# Patient Record
Sex: MALE | Race: OTHER | NOT HISPANIC OR LATINO
[De-identification: names, ages, dates, MRNs, and addresses within clinical notes are randomized per-mention and may not be internally consistent; named-entity substitution may affect disease eponyms.]

---

## 2022-01-11 PROBLEM — Z00.00 ENCOUNTER FOR PREVENTIVE HEALTH EXAMINATION: Status: ACTIVE | Noted: 2022-01-11

## 2022-01-14 ENCOUNTER — APPOINTMENT (OUTPATIENT)
Dept: OTOLARYNGOLOGY | Facility: CLINIC | Age: 32
End: 2022-01-14
Payer: COMMERCIAL

## 2022-01-14 VITALS
WEIGHT: 143.3 LBS | TEMPERATURE: 97 F | DIASTOLIC BLOOD PRESSURE: 61 MMHG | HEIGHT: 70 IN | HEART RATE: 61 BPM | SYSTOLIC BLOOD PRESSURE: 101 MMHG | BODY MASS INDEX: 20.52 KG/M2

## 2022-01-14 DIAGNOSIS — R06.83 SNORING: ICD-10-CM

## 2022-01-14 DIAGNOSIS — J34.2 DEVIATED NASAL SEPTUM: ICD-10-CM

## 2022-01-14 DIAGNOSIS — D75.A GLUCOSE-6-PHOSPHATE DEHYDROGENASE: ICD-10-CM

## 2022-01-14 DIAGNOSIS — Z78.9 OTHER SPECIFIED HEALTH STATUS: ICD-10-CM

## 2022-01-14 PROCEDURE — 99204 OFFICE O/P NEW MOD 45 MIN: CPT | Mod: 25

## 2022-01-14 PROCEDURE — 31231 NASAL ENDOSCOPY DX: CPT

## 2022-02-21 PROBLEM — R06.83 SNORING: Status: ACTIVE | Noted: 2022-02-21

## 2022-02-21 NOTE — HISTORY OF PRESENT ILLNESS
[de-identified] : Mr. Llamas is a 31 year old man who was referred by Dr. Marks for deviated septum and turbinate hypertrophy\par PMH:  G6PD deficiency\par \par Trouble breathing through his nose for his whole life, worse when he has a URI such as now. He breathes through his mouth.  No change in nasal breathing when lying down.\par Has seasonal allergies, mainly in spring.\par Years ago,he tried steroid nasal sprays consistently for 4-5 weeks at a time without improvement.. \par He uses Zyrtec as needed with some improvement.\par Snores at night.  His partner has woken him up at night for "weird snoring" on occasion. He feels rested on waking up.\par No runny nose, postnasal drip, chronic sinusitis. \par No nasal trauma. \par

## 2022-02-21 NOTE — REVIEW OF SYSTEMS
[Patient Intake Form Reviewed] : Patient intake form was reviewed [As Noted in HPI] : as noted in HPI [Negative] : Heme/Lymph [FreeTextEntry6] : chest congestion

## 2022-02-21 NOTE — CONSULT LETTER
[Dear  ___] : Dear  [unfilled], [( Thank you for referring [unfilled] for consultation for _____ )] : Thank you for referring [unfilled] for consultation for [unfilled] [Please see my note below.] : Please see my note below. [Consult Closing:] : Thank you very much for allowing me to participate in the care of this patient.  If you have any questions, please do not hesitate to contact me. [Sincerely,] : Sincerely, [FreeTextEntry2] : Ulises Marks MD\par [address] [facility]\[place] [address]\par  [FreeTextEntry3] : \par Brittny Thompson MD \par Otolaryngology, Head and Neck Surgery \par \par

## 2022-02-21 NOTE — ASSESSMENT
[FreeTextEntry1] : Mr. Llamas has chronic nasal congestion for many years due to deviated nasal septum and turbinate hypertrophy.\par He also has seasonal allergies and G6PD deficiency.\par No change in sx after at least 1 month of steroid nasal spray.\par -->  I recommended septoplasty and bilateral  inferior turbinate reduction.\par Risks, benefits and alternatives were discussed with patient.\par Risks include, but are not limited to, bleeding, infection, septal hematoma or abscess, septal perforation, intranasal scarring, difficulty breathing, and need for further treatment.\par He  is aware that surgery does not treat allergy.\par He will consider surgery.  He will contact my office if he has additional questions or if he want to schedule surgery.\par \par Snoring without observed apneas or daytime somnolence.\par \par \par

## 2022-02-21 NOTE — PROCEDURE
[FreeTextEntry6] : \par Indication: deviated nasal septum (can't see past on anterior rhinoscopy)\par -Verbal consent was obtained from patient prior to exam. \par - Cecilio-Synephrine and lidocaine 2% spray applied to nose bilaterally.\par Nasal endoscopy was performed with flexible scope.\par Findings: \par -- Inferior turbinate hypertrophy  bilateral.  Inferior meatus clear bilateral.\par -- Septum was deviated to the left touching inferior turbinate all the way, with a right spur touching the lateral wall\par -- No polyps either side nose\par -- Mucus clear bilateral\par -- Middle and superior turbinates normal bilateral\par -- Middle meatus clear bilateral.  SER clear bilateral.\par -- Nasopharynx without mass or exudate\par -- Adenoids were absent   \par -- Eustachian orifices were clear bilateral\par \par The patient tolerated the procedure well.\par

## 2022-02-21 NOTE — PHYSICAL EXAM
[Midline] : trachea located in midline position [FreeTextEntry1] : No hoarseness.  [Nasal Endoscopy Performed] : nasal endoscopy was performed, see procedure section for findings [] : septum deviated bilaterally [de-identified] : septum deviation left anteriorly with septal spur on the right [Normal] : palatine tonsils are normal [de-identified] : 3+ [de-identified] : Carotid pulses 2+ bilateral.

## 2022-04-25 ENCOUNTER — NON-APPOINTMENT (OUTPATIENT)
Age: 32
End: 2022-04-25

## 2022-04-25 ENCOUNTER — APPOINTMENT (OUTPATIENT)
Dept: INTERNAL MEDICINE | Facility: CLINIC | Age: 32
End: 2022-04-25
Payer: COMMERCIAL

## 2022-04-25 VITALS
BODY MASS INDEX: 21.47 KG/M2 | WEIGHT: 150 LBS | OXYGEN SATURATION: 98 % | HEART RATE: 63 BPM | TEMPERATURE: 98.6 F | SYSTOLIC BLOOD PRESSURE: 110 MMHG | HEIGHT: 70 IN | DIASTOLIC BLOOD PRESSURE: 73 MMHG

## 2022-04-25 DIAGNOSIS — Z01.812 ENCOUNTER FOR PREPROCEDURAL LABORATORY EXAMINATION: ICD-10-CM

## 2022-04-25 DIAGNOSIS — Z01.818 ENCOUNTER FOR OTHER PREPROCEDURAL EXAMINATION: ICD-10-CM

## 2022-04-25 PROCEDURE — 36415 COLL VENOUS BLD VENIPUNCTURE: CPT

## 2022-04-25 PROCEDURE — 99204 OFFICE O/P NEW MOD 45 MIN: CPT | Mod: 25

## 2022-04-25 NOTE — PHYSICAL EXAM
[No Acute Distress] : no acute distress [Well Nourished] : well nourished [Well Developed] : well developed [Well-Appearing] : well-appearing [Normal Sclera/Conjunctiva] : normal sclera/conjunctiva [EOMI] : extraocular movements intact [Thyroid Normal, No Nodules] : the thyroid was normal and there were no nodules present [No Respiratory Distress] : no respiratory distress  [Clear to Auscultation] : lungs were clear to auscultation bilaterally [Normal Rate] : normal rate  [Regular Rhythm] : with a regular rhythm [Normal S1, S2] : normal S1 and S2 [No Murmur] : no murmur heard [No Edema] : there was no peripheral edema [No Extremity Clubbing/Cyanosis] : no extremity clubbing/cyanosis [Soft] : abdomen soft [Non Tender] : non-tender [Non-distended] : non-distended [No Masses] : no abdominal mass palpated [No HSM] : no HSM [No Rash] : no rash [Coordination Grossly Intact] : coordination grossly intact [No Focal Deficits] : no focal deficits [Normal Gait] : normal gait [Normal Affect] : the affect was normal [Alert and Oriented x3] : oriented to person, place, and time [Normal Insight/Judgement] : insight and judgment were intact

## 2022-04-26 DIAGNOSIS — D72.19 OTHER EOSINOPHILIA: ICD-10-CM

## 2022-04-26 LAB
ALBUMIN SERPL ELPH-MCNC: 4.4 G/DL
ALP BLD-CCNC: 70 U/L
ALT SERPL-CCNC: 27 U/L
ANION GAP SERPL CALC-SCNC: 11 MMOL/L
APPEARANCE: CLEAR
APTT BLD: 30.4 SEC
AST SERPL-CCNC: 19 U/L
BASOPHILS # BLD AUTO: 0.03 K/UL
BASOPHILS NFR BLD AUTO: 0.5 %
BILIRUB SERPL-MCNC: 0.8 MG/DL
BILIRUBIN URINE: NEGATIVE
BLOOD URINE: NEGATIVE
BUN SERPL-MCNC: 15 MG/DL
CALCIUM SERPL-MCNC: 9.5 MG/DL
CHLORIDE SERPL-SCNC: 108 MMOL/L
CO2 SERPL-SCNC: 22 MMOL/L
COLOR: NORMAL
CREAT SERPL-MCNC: 0.88 MG/DL
EGFR: 119 ML/MIN/1.73M2
EOSINOPHIL # BLD AUTO: 0.66 K/UL
EOSINOPHIL NFR BLD AUTO: 10.1 %
GLUCOSE QUALITATIVE U: NEGATIVE
GLUCOSE SERPL-MCNC: 94 MG/DL
HCT VFR BLD CALC: 39.2 %
HGB BLD-MCNC: 12.1 G/DL
IMM GRANULOCYTES NFR BLD AUTO: 0.3 %
INR PPP: 0.96 RATIO
KETONES URINE: NEGATIVE
LEUKOCYTE ESTERASE URINE: NEGATIVE
LYMPHOCYTES # BLD AUTO: 2.04 K/UL
LYMPHOCYTES NFR BLD AUTO: 31.2 %
MAN DIFF?: NORMAL
MCHC RBC-ENTMCNC: 28.3 PG
MCHC RBC-ENTMCNC: 30.9 GM/DL
MCV RBC AUTO: 91.8 FL
MONOCYTES # BLD AUTO: 0.39 K/UL
MONOCYTES NFR BLD AUTO: 6 %
NEUTROPHILS # BLD AUTO: 3.39 K/UL
NEUTROPHILS NFR BLD AUTO: 51.9 %
NITRITE URINE: NEGATIVE
PH URINE: 6
PLATELET # BLD AUTO: 265 K/UL
POTASSIUM SERPL-SCNC: 4.3 MMOL/L
PROT SERPL-MCNC: 7.3 G/DL
PROTEIN URINE: NEGATIVE
PT BLD: 11.2 SEC
RBC # BLD: 4.27 M/UL
RBC # FLD: 12.4 %
SODIUM SERPL-SCNC: 141 MMOL/L
SPECIFIC GRAVITY URINE: 1.02
UROBILINOGEN URINE: NORMAL
WBC # FLD AUTO: 6.53 K/UL

## 2022-04-26 NOTE — HISTORY OF PRESENT ILLNESS
[No Pertinent Cardiac History] : no history of aortic stenosis, atrial fibrillation, coronary artery disease, recent myocardial infarction, or implantable device/pacemaker [No Pertinent Pulmonary History] : no history of asthma, COPD, sleep apnea, or smoking [No Adverse Anesthesia Reaction] : no adverse anesthesia reaction in self or family member [(Patient denies any chest pain, claudication, dyspnea on exertion, orthopnea, palpitations or syncope)] : Patient denies any chest pain, claudication, dyspnea on exertion, orthopnea, palpitations or syncope [Excellent (>10 METs)] : Excellent (>10 METs) [Chronic Anticoagulation] : no chronic anticoagulation [Chronic Kidney Disease] : no chronic kidney disease [Diabetes] : no diabetes [FreeTextEntry1] : Septoplasty and turbinate reduction [FreeTextEntry2] : 5/16/2022 [FreeTextEntry3] : Dr. Thompson [FreeTextEntry4] : h/o chronic nasal congestion unresponsive to conservative management. Found to have swollen turbinates and deviated nasal septum.

## 2022-04-26 NOTE — ASSESSMENT
[High Risk Surgery - Intraperitoneal, Intrathoracic or Supringuinal Vascular Procedures] : High Risk Surgery - Intraperitoneal, Intrathoracic or Supringuinal Vascular Procedures - No (0) [Ischemic Heart Disease] : Ischemic Heart Disease - No (0) [Congestive Heart Failure] : Congestive Heart Failure - No (0) [Prior Cerebrovascular Accident or TIA] : Prior Cerebrovascular Accident or TIA - No (0) [Creatinine >= 2mg/dL (1 Point)] : Creatinine >= 2mg/dL - No (0) [Insulin-dependent Diabetic (1 Point)] : Insulin-dependent Diabetic - No (0) [0] : 0 , RCRI Class: I, Risk of Post-Op Cardiac Complications: 3.9%, 95% CI for Risk Estimate: 2.8% - 5.4% [Patient Optimized for Surgery] : Patient optimized for surgery [No Further Testing Recommended] : no further testing recommended [FreeTextEntry4] : LOW risk for LOW risk procedure. May proceed with elective surgery.

## 2022-05-05 ENCOUNTER — TRANSCRIPTION ENCOUNTER (OUTPATIENT)
Age: 32
End: 2022-05-05

## 2022-05-13 ENCOUNTER — LABORATORY RESULT (OUTPATIENT)
Age: 32
End: 2022-05-13

## 2022-05-13 NOTE — ASU PATIENT PROFILE, ADULT - NS PREOP UNDERSTANDS INFO
yes No solid food after midnight, take off all body rings, no alcoholic beverages, drugs, tobaccos 24 hours prior to the day of surgery, loose comfortable clothes, shoes, dress in layers, patient to bring picture ID, covid ID card, insurance card and a credit card for any financial transactions. Address and parking information given. Patient understands and verbalizes preop instructions, time given/no

## 2022-05-13 NOTE — ASU PATIENT PROFILE, ADULT - MEDICATION HERBAL REMEDIES, PROFILE
CalledâPSA normal.  TSH elevated indication patient is hypothyroid. Recommend dose change thyroxine 188 mcg. #30 to local pharmacy. Check repeat TSH in 1 month. Cholesterol 178,  with goal of less than 100. Continue with low-cholesterol diet. Chemistry within normal limits. Elevated blood sugar 119.   CBC normal
Patient notified via spouse Charisma Kaplan. Orders placed.
Please further advise. Do you want 2  prescriptions? 100mcg + 88mcg tab daily ?
Yes , thanks
no

## 2022-05-13 NOTE — ASU PATIENT PROFILE, ADULT - FALL HARM RISK - UNIVERSAL INTERVENTIONS
Bed in lowest position, wheels locked, appropriate side rails in place/Call bell, personal items and telephone in reach/Instruct patient to call for assistance before getting out of bed or chair/Non-slip footwear when patient is out of bed/[place] to call system/Physically safe environment - no spills, clutter or unnecessary equipment/Purposeful Proactive Rounding/Room/bathroom lighting operational, light cord in reach

## 2022-05-16 ENCOUNTER — NON-APPOINTMENT (OUTPATIENT)
Age: 32
End: 2022-05-16

## 2022-05-16 ENCOUNTER — OUTPATIENT (OUTPATIENT)
Dept: OUTPATIENT SERVICES | Facility: HOSPITAL | Age: 32
LOS: 1 days | Discharge: HOME | End: 2022-05-16
Payer: COMMERCIAL

## 2022-05-16 ENCOUNTER — TRANSCRIPTION ENCOUNTER (OUTPATIENT)
Age: 32
End: 2022-05-16

## 2022-05-16 ENCOUNTER — RESULT REVIEW (OUTPATIENT)
Age: 32
End: 2022-05-16

## 2022-05-16 ENCOUNTER — APPOINTMENT (OUTPATIENT)
Dept: OTOLARYNGOLOGY | Facility: HOSPITAL | Age: 32
End: 2022-05-16

## 2022-05-16 VITALS
DIASTOLIC BLOOD PRESSURE: 57 MMHG | RESPIRATION RATE: 16 BRPM | OXYGEN SATURATION: 100 % | TEMPERATURE: 98 F | HEIGHT: 71 IN | HEART RATE: 100 BPM | WEIGHT: 147.93 LBS | SYSTOLIC BLOOD PRESSURE: 104 MMHG

## 2022-05-16 VITALS
SYSTOLIC BLOOD PRESSURE: 99 MMHG | HEART RATE: 63 BPM | RESPIRATION RATE: 16 BRPM | OXYGEN SATURATION: 98 % | DIASTOLIC BLOOD PRESSURE: 64 MMHG | TEMPERATURE: 97 F

## 2022-05-16 PROCEDURE — 88300 SURGICAL PATH GROSS: CPT | Mod: 26

## 2022-05-16 PROCEDURE — 30802 ABLATE INF TURBINATE SUBMUC: CPT | Mod: GC

## 2022-05-16 PROCEDURE — 30520 REPAIR OF NASAL SEPTUM: CPT | Mod: GC

## 2022-05-16 RX ORDER — CEPHALEXIN 500 MG
1 CAPSULE ORAL
Qty: 0 | Refills: 0 | DISCHARGE

## 2022-05-16 RX ORDER — SODIUM CHLORIDE 9 MG/ML
1000 INJECTION, SOLUTION INTRAVENOUS ONCE
Refills: 0 | Status: COMPLETED | OUTPATIENT
Start: 2022-05-16 | End: 2022-05-16

## 2022-05-16 RX ORDER — ACETAMINOPHEN 500 MG
1000 TABLET ORAL ONCE
Refills: 0 | Status: COMPLETED | OUTPATIENT
Start: 2022-05-16 | End: 2022-05-16

## 2022-05-16 RX ORDER — SODIUM CHLORIDE 9 MG/ML
500 INJECTION, SOLUTION INTRAVENOUS
Refills: 0 | Status: DISCONTINUED | OUTPATIENT
Start: 2022-05-16 | End: 2022-05-16

## 2022-05-16 RX ORDER — FENTANYL CITRATE 50 UG/ML
50 INJECTION INTRAVENOUS
Refills: 0 | Status: DISCONTINUED | OUTPATIENT
Start: 2022-05-16 | End: 2022-05-16

## 2022-05-16 RX ORDER — HYDROMORPHONE HYDROCHLORIDE 2 MG/ML
0.5 INJECTION INTRAMUSCULAR; INTRAVENOUS; SUBCUTANEOUS
Refills: 0 | Status: DISCONTINUED | OUTPATIENT
Start: 2022-05-16 | End: 2022-05-16

## 2022-05-16 RX ORDER — OXYCODONE HYDROCHLORIDE 5 MG/1
5 TABLET ORAL ONCE
Refills: 0 | Status: DISCONTINUED | OUTPATIENT
Start: 2022-05-16 | End: 2022-05-16

## 2022-05-16 RX ORDER — APREPITANT 80 MG/1
40 CAPSULE ORAL ONCE
Refills: 0 | Status: COMPLETED | OUTPATIENT
Start: 2022-05-16 | End: 2022-05-16

## 2022-05-16 RX ADMIN — Medication 1000 MILLIGRAM(S): at 06:44

## 2022-05-16 RX ADMIN — SODIUM CHLORIDE 125 MILLILITER(S): 9 INJECTION, SOLUTION INTRAVENOUS at 10:37

## 2022-05-16 RX ADMIN — SODIUM CHLORIDE 1000 MILLILITER(S): 9 INJECTION, SOLUTION INTRAVENOUS at 10:14

## 2022-05-16 RX ADMIN — FENTANYL CITRATE 50 MICROGRAM(S): 50 INJECTION INTRAVENOUS at 10:50

## 2022-05-16 RX ADMIN — FENTANYL CITRATE 50 MICROGRAM(S): 50 INJECTION INTRAVENOUS at 10:31

## 2022-05-16 RX ADMIN — OXYCODONE HYDROCHLORIDE 5 MILLIGRAM(S): 5 TABLET ORAL at 11:25

## 2022-05-16 RX ADMIN — FENTANYL CITRATE 50 MICROGRAM(S): 50 INJECTION INTRAVENOUS at 10:40

## 2022-05-16 RX ADMIN — FENTANYL CITRATE 50 MICROGRAM(S): 50 INJECTION INTRAVENOUS at 11:00

## 2022-05-16 RX ADMIN — OXYCODONE HYDROCHLORIDE 5 MILLIGRAM(S): 5 TABLET ORAL at 11:55

## 2022-05-16 RX ADMIN — Medication 1000 MILLIGRAM(S): at 12:57

## 2022-05-16 RX ADMIN — APREPITANT 40 MILLIGRAM(S): 80 CAPSULE ORAL at 06:44

## 2022-05-16 RX ADMIN — Medication 400 MILLIGRAM(S): at 12:27

## 2022-05-16 NOTE — ASU DISCHARGE PLAN (ADULT/PEDIATRIC) - NS MD DC FALL RISK RISK
For information on Fall & Injury Prevention, visit: [email or web address] OR  [email or web address] OR  https://www.cdc.gov/steadi/patient.html

## 2022-05-16 NOTE — BRIEF OPERATIVE NOTE - NSICDXBRIEFPOSTOP_GEN_ALL_CORE_FT
POST-OP DIAGNOSIS:  Deviated nasal septum 16-May-2022 09:26:20  Sukhjinder Koenig  Hypertrophy of both inferior nasal turbinates 16-May-2022 09:26:32  Sukhjinder Koenig

## 2022-05-16 NOTE — ASU DISCHARGE PLAN (ADULT/PEDIATRIC) - CARE PROVIDER_API CALL
Brittny Thompson)  Otolaryngology  [address] [place] [address], [address] [place] [address]  Phone: [phone]  Fax: [phone]  Scheduled Appointment: 05/20/2022 09:30 AM

## 2022-05-16 NOTE — BRIEF OPERATIVE NOTE - OPERATION/FINDINGS
deviated septum to left with curling of caudal septal cartilage to left, bilateral septal spurs L > R.

## 2022-05-16 NOTE — ASU DISCHARGE PLAN (ADULT/PEDIATRIC) - HISTORY OF COVID-19 VACCINATION
Please follow up in the below listed clinic in 2 days for re-evaluation of your symptoms and further treatment.     Please take all medications as instructed.     Please note that your care in the Emergency Department today is not a substitute for good follow-up and a comprehensive evaluation through a primary care physician. There are no immediate, life threatening causes for your symptoms at this time. Symptoms can change and new problems may arise after your evaluation. Please do not hesitate to return for further care.     Please follow up as an outpatient as directed. Please return to the nearest ED if your symptoms worsen or if you experience any chest pain, problems breathing, fevers, chills, nausea, vomiting, abdominal pain, weakness, abnormal swelling, severe headache, worsening pain, change in your pain, or other concerning symptoms. Please take all medications as directed for the full prescribed course and stay well hydrated.     If you had radiographic studies performed in the ED, an attending radiologist will over-read your results within 24 hours. You will be called with any changes in the initial impression and results.     Yes

## 2022-05-16 NOTE — ASU DISCHARGE PLAN (ADULT/PEDIATRIC) - ASU DC SPECIAL INSTRUCTIONSFT
Nasal saline spray 3 times per day  take antibiotics as prescribed  tylenol and/or motrin over the counter for pain Nasal saline spray 3 times per day  take antibiotics as prescribed  tylenol over the counter for pain

## 2022-05-16 NOTE — BRIEF OPERATIVE NOTE - NSICDXBRIEFPREOP_GEN_ALL_CORE_FT
PRE-OP DIAGNOSIS:  Deviated nasal septum 16-May-2022 09:25:55  Sukhjinder Koenig  Hypertrophy of inferior nasal turbinate 16-May-2022 09:26:08  Sukhjinder Koenig

## 2022-05-20 ENCOUNTER — NON-APPOINTMENT (OUTPATIENT)
Age: 32
End: 2022-05-20

## 2022-05-20 ENCOUNTER — APPOINTMENT (OUTPATIENT)
Dept: OTOLARYNGOLOGY | Facility: CLINIC | Age: 32
End: 2022-05-20
Payer: COMMERCIAL

## 2022-05-20 VITALS
BODY MASS INDEX: 20.47 KG/M2 | TEMPERATURE: 97 F | DIASTOLIC BLOOD PRESSURE: 68 MMHG | HEART RATE: 63 BPM | WEIGHT: 143 LBS | HEIGHT: 70 IN | SYSTOLIC BLOOD PRESSURE: 108 MMHG

## 2022-05-20 DIAGNOSIS — Z87.898 PERSONAL HISTORY OF OTHER SPECIFIED CONDITIONS: ICD-10-CM

## 2022-05-20 PROBLEM — Z78.9 OTHER SPECIFIED HEALTH STATUS: Chronic | Status: ACTIVE | Noted: 2022-05-16

## 2022-05-20 PROCEDURE — 99024 POSTOP FOLLOW-UP VISIT: CPT

## 2022-05-24 PROBLEM — Z87.898 HISTORY OF NASAL CONGESTION: Status: RESOLVED | Noted: 2022-01-14 | Resolved: 2022-05-24

## 2022-05-24 RX ORDER — SODIUM CHLORIDE 0.65 %
0.65 AEROSOL, SPRAY (ML) NASAL
Refills: 0 | Status: ACTIVE | COMMUNITY

## 2022-05-24 NOTE — ASSESSMENT
[FreeTextEntry1] : Mr. Llamas is s/p septoplasty and inferior turbinate reduction on May 16, 2022.\par Matos splints were removed today. \par Breathing has improved. Septum is intact, no hematoma.\par He still has some nasal clogging but could not tolerate much suctioning.\par \par PLAN\par --> continue saline nasal spray q 1-2 hrs while awake\par --> may blow nose lightly\par \par Return in 1-2 weeks.\par

## 2022-05-24 NOTE — PHYSICAL EXAM
[FreeTextEntry1] : No hoarseness.  [Normal] : external appearance is normal [de-identified] : Mild edema inferior turbinates bilateral, [de-identified] : Matos sprints were removed.  No septal hematoma.

## 2022-05-24 NOTE — END OF VISIT
[FreeTextEntry3] : All medical record entries made by the Scribe were at my, Dr. Brittny Thompson, direction and personally dictated by me on 05/20/2022. I have reviewed the chart and agree that the record accurately reflects my personal performance of the history, physical exam, assessment and plan. I have also personally directed, reviewed, and agreed with the chart.

## 2022-05-24 NOTE — HISTORY OF PRESENT ILLNESS
[de-identified] : Mr. Llamas underwent septoplasty and inferior turbinate reduction on May 16, 2022, at [facility].\par Matos splints were placed bilateral \par On Keflex and saline nasal spray \par \par Today he reports having nasal drainage; no bloody drainage since Wednesday night 5/18.  He has nasal clogging and some pain. He has been taking Keflex twice a day instead of 3 times a day.\par

## 2022-05-25 ENCOUNTER — APPOINTMENT (OUTPATIENT)
Dept: OTOLARYNGOLOGY | Facility: CLINIC | Age: 32
End: 2022-05-25

## 2022-06-02 LAB — SURGICAL PATHOLOGY STUDY: SIGNIFICANT CHANGE UP

## 2022-06-02 NOTE — ADDENDUM
[FreeTextEntry1] : Documented by Shayla Zhang acting as a scribe for Dr. Brittny Thompson on 05/20/2022. 
Reading

## 2022-06-03 ENCOUNTER — NON-APPOINTMENT (OUTPATIENT)
Age: 32
End: 2022-06-03

## 2022-06-03 ENCOUNTER — APPOINTMENT (OUTPATIENT)
Dept: OTOLARYNGOLOGY | Facility: CLINIC | Age: 32
End: 2022-06-03
Payer: COMMERCIAL

## 2022-06-03 VITALS
DIASTOLIC BLOOD PRESSURE: 73 MMHG | HEIGHT: 70 IN | BODY MASS INDEX: 20.47 KG/M2 | WEIGHT: 143 LBS | HEART RATE: 83 BPM | SYSTOLIC BLOOD PRESSURE: 121 MMHG | TEMPERATURE: 97.9 F

## 2022-06-03 DIAGNOSIS — Z98.890 OTHER SPECIFIED POSTPROCEDURAL STATES: ICD-10-CM

## 2022-06-03 DIAGNOSIS — J34.3 HYPERTROPHY OF NASAL TURBINATES: ICD-10-CM

## 2022-06-03 PROCEDURE — 99024 POSTOP FOLLOW-UP VISIT: CPT

## 2022-06-03 RX ORDER — CEPHALEXIN 250 MG/1
250 CAPSULE ORAL
Qty: 15 | Refills: 0 | Status: COMPLETED | COMMUNITY
Start: 2022-05-16 | End: 2022-05-21

## 2022-06-03 NOTE — HISTORY OF PRESENT ILLNESS
[de-identified] : Mr. Llamas is s/p septoplasty and inferior turbinate reduction on May 16, 2022/\par Septal splints were removed on 5/20.\par \par Today he reports nose is congested, like "filled with crust". He is using saline mist. \par He feels a stitch coming out out of the left nostril.\par No pain except when he touches his nose.\par

## 2022-06-03 NOTE — ASSESSMENT
[FreeTextEntry1] : Mr. Llamas is s/p septoplasty and inferior turbinate reduction on May 16, 2022.\par Septal splints were removed on 5/20.\par Mucus and crust were removed from anterior nasal cavities today, and remaining sutures were cut at the mucosal surface.  There is still a little swelling of the septal mucosa.\par Overall he said that nasal breathing is better than preoperatively.\par \par PLAN:\par -->  continue saline mist spray\par I expect that the residual swelling will go down and remaining dissolvable sutures will come out over the next 2 weeks.\par \par I would be happy to see him again as needed.

## 2022-06-03 NOTE — CONSULT LETTER
[Dear  ___] : Dear  [unfilled], [Courtesy Letter:] : I had the pleasure of seeing your patient, [unfilled], in my office today. [Please see my note below.] : Please see my note below. [Consult Closing:] : Thank you very much for allowing me to participate in the care of this patient.  If you have any questions, please do not hesitate to contact me. [Sincerely,] : Sincerely, [FreeTextEntry2] : Ulises Marks MD\par [address] [facility]\[place] [address]\par  [FreeTextEntry3] : \par Brittny Thompson MD \par Otolaryngology, Head and Neck Surgery \par \par

## 2022-06-03 NOTE — PHYSICAL EXAM
[Normal] : external appearance is normal [de-identified] : Inferior turbinates are healing. [de-identified] : Mucus suctioned from bilateral anterior nasal cavities.  Surface sutures cut flush with septum on both sides and removed.  Minor caudal deviation of septum to left side.  Airways open bilateral.

## 2023-03-23 NOTE — CONSULT LETTER
[FreeTextEntry2] : Ulises Marks MD\par [address] [facility]\[place] [address]\par  [FreeTextEntry3] : \par Brittny Thompson MD \par Otolaryngology, Head and Neck Surgery \par \par   Yes

## (undated) DEVICE — ELCTR BOVIE SUCTION 8FR 6"

## (undated) DEVICE — DRSG KLING 2"

## (undated) DEVICE — ELCTR COLORADO 3CM

## (undated) DEVICE — WARMING BLANKET LOWER ADULT

## (undated) DEVICE — SLV COMPRESSION KNEE MED

## (undated) DEVICE — PACK RHINOPLASTY

## (undated) DEVICE — SUT CHROMIC 4-0 27" RB-1

## (undated) DEVICE — GLV 6.5 PROTEXIS (WHITE)